# Patient Record
Sex: FEMALE | Race: BLACK OR AFRICAN AMERICAN | NOT HISPANIC OR LATINO
[De-identification: names, ages, dates, MRNs, and addresses within clinical notes are randomized per-mention and may not be internally consistent; named-entity substitution may affect disease eponyms.]

---

## 2020-02-04 PROBLEM — Z00.00 ENCOUNTER FOR PREVENTIVE HEALTH EXAMINATION: Status: ACTIVE | Noted: 2020-02-04

## 2020-02-12 ENCOUNTER — APPOINTMENT (OUTPATIENT)
Dept: ENDOCRINOLOGY | Facility: CLINIC | Age: 60
End: 2020-02-12
Payer: COMMERCIAL

## 2020-02-12 VITALS
BODY MASS INDEX: 48.02 KG/M2 | OXYGEN SATURATION: 95 % | HEART RATE: 105 BPM | WEIGHT: 271 LBS | HEIGHT: 62.99 IN | SYSTOLIC BLOOD PRESSURE: 110 MMHG | DIASTOLIC BLOOD PRESSURE: 77 MMHG

## 2020-02-12 DIAGNOSIS — Z82.49 FAMILY HISTORY OF ISCHEMIC HEART DISEASE AND OTHER DISEASES OF THE CIRCULATORY SYSTEM: ICD-10-CM

## 2020-02-12 DIAGNOSIS — Z82.3 FAMILY HISTORY OF STROKE: ICD-10-CM

## 2020-02-12 DIAGNOSIS — J45.909 UNSPECIFIED ASTHMA, UNCOMPLICATED: ICD-10-CM

## 2020-02-12 DIAGNOSIS — E11.9 TYPE 2 DIABETES MELLITUS W/OUT COMPLICATIONS: ICD-10-CM

## 2020-02-12 DIAGNOSIS — Z83.3 FAMILY HISTORY OF DIABETES MELLITUS: ICD-10-CM

## 2020-02-12 DIAGNOSIS — E04.9 NONTOXIC GOITER, UNSPECIFIED: ICD-10-CM

## 2020-02-12 DIAGNOSIS — E78.5 HYPERLIPIDEMIA, UNSPECIFIED: ICD-10-CM

## 2020-02-12 DIAGNOSIS — I10 ESSENTIAL (PRIMARY) HYPERTENSION: ICD-10-CM

## 2020-02-12 DIAGNOSIS — E66.9 OBESITY, UNSPECIFIED: ICD-10-CM

## 2020-02-12 PROCEDURE — 82962 GLUCOSE BLOOD TEST: CPT

## 2020-02-12 PROCEDURE — 99204 OFFICE O/P NEW MOD 45 MIN: CPT | Mod: 25

## 2020-02-12 NOTE — PAST MEDICAL HISTORY
[Menarche Age ____] : age at menarche was [unfilled] [Surgical Menopause] : The patient is in surgical menopause [Menopause Age____] : age at menopause was [unfilled] [Regular Cycle Intervals] : have been regular [Total Preg ___] : G[unfilled] [Live Births ___] : P[unfilled]  [AB Spont ___] : miscarriages: [unfilled]

## 2020-02-13 PROBLEM — E11.9 DIABETES: Status: ACTIVE | Noted: 2020-02-12

## 2020-02-13 PROBLEM — E78.5 HYPERLIPIDEMIA: Status: ACTIVE | Noted: 2020-02-12

## 2020-02-13 PROBLEM — E66.9 OBESITY: Status: ACTIVE | Noted: 2020-02-12

## 2020-02-13 PROBLEM — E04.9 ENLARGED THYROID: Status: ACTIVE | Noted: 2020-02-12

## 2020-02-13 LAB — GLUCOSE BLDC GLUCOMTR-MCNC: 116

## 2020-02-13 RX ORDER — METFORMIN HYDROCHLORIDE 500 MG/1
500 TABLET, COATED ORAL
Refills: 0 | Status: ACTIVE | COMMUNITY

## 2020-02-13 RX ORDER — ATORVASTATIN CALCIUM 10 MG/1
10 TABLET, FILM COATED ORAL
Refills: 0 | Status: ACTIVE | COMMUNITY

## 2020-02-13 RX ORDER — AMLODIPINE BESYLATE 10 MG/1
10 TABLET ORAL
Refills: 0 | Status: ACTIVE | COMMUNITY

## 2020-02-13 RX ORDER — ALBUTEROL SULFATE 90 UG/1
108 (90 BASE) AEROSOL, METERED RESPIRATORY (INHALATION)
Qty: 8 | Refills: 0 | Status: ACTIVE | COMMUNITY
Start: 2020-01-23

## 2020-02-13 RX ORDER — LISINOPRIL 5 MG/1
5 TABLET ORAL
Refills: 0 | Status: ACTIVE | COMMUNITY

## 2020-02-13 RX ORDER — ALBUTEROL SULFATE 2.5 MG/3ML
(2.5 MG/3ML) SOLUTION RESPIRATORY (INHALATION)
Qty: 90 | Refills: 0 | Status: ACTIVE | COMMUNITY
Start: 2020-01-23

## 2020-02-13 RX ORDER — NEBULIZER AND COMPRESSOR
EACH MISCELLANEOUS
Qty: 1 | Refills: 0 | Status: ACTIVE | COMMUNITY
Start: 2020-01-23

## 2020-02-13 NOTE — ASSESSMENT
[FreeTextEntry1] : 1. type 2 diabetes/obesity - We discussed what is diabetes and insulin resistance. We discussed the importance of weight loss in the management of her comorbidities. We discussed the risks of continued excess weight on long term health. She will start taking Metformin BID. Can consider GLP1 therapy.  We discussed at length the importance of following a carbohydrate balanced diet and the importance of incorporating protein in meals. We also discussed appropriate alternative food choices. We discussed ways she can incorporate exercise into her daily routine. Will complete labs to r/o secondary causes of obesity.\par 2. enlarged thyroid - will go for baseline thyroid sono. Will check TFTs including thyroid ab. \par f/u in 2 weeks for treatment plan adjustment pending labs.  [Carbohydrate Consistent Diet] : carbohydrate consistent diet [Diabetes Foot Care] : diabetes foot care [Long Term Vascular Complications] : long term vascular complications of diabetes [Importance of Diet and Exercise] : importance of diet and exercise to improve glycemic control, achieve weight loss and improve cardiovascular health [Retinopathy Screening] : Patient was referred to ophthalmology for retinopathy screening [Injection Technique, Storage, Sharps Disposal] : injection technique, storage, and sharps disposal [Self Monitoring of Blood Glucose] : self monitoring of blood glucose [Incretin Mimetic Therapy] : Risks and benefits of incretin mimetic therapy were discussed with the patient including nauseau, pancreatitis and potential risk of medullary thyroid cancer

## 2020-02-13 NOTE — PHYSICAL EXAM
[Alert] : alert [No Acute Distress] : no acute distress [Well Nourished] : well nourished [Well Developed] : well developed [EOMI] : extra ocular movement intact [No Proptosis] : no proptosis [Normal Sclera/Conjunctiva] : normal sclera/conjunctiva [Normal Oropharynx] : the oropharynx was normal [No Respiratory Distress] : no respiratory distress [No Thyroid Nodules] : there were no palpable thyroid nodules [No Accessory Muscle Use] : no accessory muscle use [Clear to Auscultation] : lungs were clear to auscultation bilaterally [Normal S1, S2] : normal S1 and S2 [Normal Rate] : heart rate was normal  [No Edema] : there was no peripheral edema [Pedal Pulses Normal] : the pedal pulses are present [Regular Rhythm] : with a regular rhythm [Soft] : abdomen soft [Normal Bowel Sounds] : normal bowel sounds [Not Tender] : non-tender [Not Distended] : not distended [No Spinal Tenderness] : no spinal tenderness [Normal Gait] : normal gait [No Stigmata of Cushings Syndrome] : no stigmata of cushings syndrome [Spine Straight] : spine straight [No Rash] : no rash [Normal Strength/Tone] : muscle strength and tone were normal [Acanthosis Nigricans] : acanthosis nigricans [Normal] : normal [Diminished Throughout Both Feet] : normal tactile sensation with monofilament testing throughout both feet [Full ROM] : with full range of motion [Normal Reflexes] : deep tendon reflexes were 2+ and symmetric [No Tremors] : no tremors [Oriented x3] : oriented to person, place, and time [de-identified] : mildly enlarged, firm texture

## 2020-02-13 NOTE — REVIEW OF SYSTEMS
[Recent Weight Loss (___ Lbs)] : recent [unfilled] ~Ulb weight loss [Polyuria] : polyuria [Muscle Cramps] : muscle cramps [Dry Skin] : dry skin [Pain/Numbness of Digits] : pain/numbness of digits [Negative] : Heme/Lymph [All other systems negative] : All other systems negative [FreeTextEntry6] : asthma

## 2020-02-13 NOTE — HISTORY OF PRESENT ILLNESS
[FreeTextEntry1] : 59 year old female presents for evaluation of new onset type 2 diabetes and obesity. She was recently told by her PCP that she has diabetes. She was started on Metformin 500 mg BID (only takes once daily). She has cut out all soda since finding out. She was previously drinking up to 2 L of soda daily. She has also started eating a more carb balanced diet. She does not exercise. She states she has lost 18 lbs since her diagnosis a few months ago. She does report numbness and tingling in b/l hands. She has not seen Optho in some time. \par She would like to lose weight. She reports she has always been very heavy. She does state she does not understand why since she hardly eats. She is motivated to lose weight.\par She is also being treated by her PCP for hypertension, hyperlipidemia and asthma.

## 2020-02-13 NOTE — CONSULT LETTER
[Dear  ___] : Dear  [unfilled], [Consult Letter:] : I had the pleasure of evaluating your patient, [unfilled]. [Please see my note below.] : Please see my note below. [Consult Closing:] : Thank you very much for allowing me to participate in the care of this patient.  If you have any questions, please do not hesitate to contact me. [Sincerely,] : Sincerely, [FreeTextEntry3] : Gin NAVARROC